# Patient Record
Sex: FEMALE | Race: WHITE | Employment: FULL TIME | URBAN - METROPOLITAN AREA
[De-identification: names, ages, dates, MRNs, and addresses within clinical notes are randomized per-mention and may not be internally consistent; named-entity substitution may affect disease eponyms.]

---

## 2017-04-18 ENCOUNTER — OFFICE VISIT (OUTPATIENT)
Dept: FAMILY MEDICINE CLINIC | Age: 42
End: 2017-04-18

## 2017-04-18 VITALS
HEIGHT: 68 IN | BODY MASS INDEX: 20.79 KG/M2 | WEIGHT: 137.2 LBS | OXYGEN SATURATION: 99 % | SYSTOLIC BLOOD PRESSURE: 150 MMHG | HEART RATE: 105 BPM | DIASTOLIC BLOOD PRESSURE: 104 MMHG

## 2017-04-18 DIAGNOSIS — I10 ESSENTIAL HYPERTENSION: ICD-10-CM

## 2017-04-18 DIAGNOSIS — F43.22 ADJUSTMENT DISORDER WITH ANXIETY: Primary | ICD-10-CM

## 2017-04-18 PROCEDURE — 99203 OFFICE O/P NEW LOW 30 MIN: CPT | Performed by: FAMILY MEDICINE

## 2017-04-18 PROCEDURE — G0444 DEPRESSION SCREEN ANNUAL: HCPCS | Performed by: FAMILY MEDICINE

## 2017-04-18 RX ORDER — LISINOPRIL 10 MG/1
10 TABLET ORAL DAILY
Qty: 30 TABLET | Refills: 3 | Status: SHIPPED | OUTPATIENT
Start: 2017-04-18 | End: 2022-02-15 | Stop reason: SDUPTHER

## 2017-04-18 RX ORDER — HYDROXYZINE HYDROCHLORIDE 25 MG/1
1 TABLET, FILM COATED ORAL EVERY 6 HOURS PRN
COMMUNITY
Start: 2017-04-13 | End: 2017-04-18 | Stop reason: SDUPTHER

## 2017-04-18 RX ORDER — NORETHINDRONE ACETATE AND ETHINYL ESTRADIOL 5-7-9-7
1 KIT ORAL DAILY
COMMUNITY
Start: 2017-03-21

## 2017-04-18 RX ORDER — HYDROXYZINE HYDROCHLORIDE 25 MG/1
25 TABLET, FILM COATED ORAL EVERY 6 HOURS PRN
Qty: 90 TABLET | Refills: 1 | Status: SHIPPED | OUTPATIENT
Start: 2017-04-18 | End: 2022-02-15 | Stop reason: SDUPTHER

## 2017-04-18 ASSESSMENT — ENCOUNTER SYMPTOMS
SINUS PRESSURE: 0
DIARRHEA: 0
VOMITING: 0
NAUSEA: 0
BLOOD IN STOOL: 0
BACK PAIN: 0
COUGH: 0
SORE THROAT: 0
EYE DISCHARGE: 0
SHORTNESS OF BREATH: 0

## 2017-04-18 ASSESSMENT — PATIENT HEALTH QUESTIONNAIRE - PHQ9
1. LITTLE INTEREST OR PLEASURE IN DOING THINGS: 0
2. FEELING DOWN, DEPRESSED OR HOPELESS: 2
SUM OF ALL RESPONSES TO PHQ9 QUESTIONS 1 & 2: 2
SUM OF ALL RESPONSES TO PHQ QUESTIONS 1-9: 2

## 2019-10-28 ENCOUNTER — HOSPITAL ENCOUNTER (OUTPATIENT)
Dept: MAMMOGRAPHY | Age: 44
Discharge: HOME | End: 2019-10-28
Payer: COMMERCIAL

## 2019-10-28 DIAGNOSIS — Z12.39 BREAST CANCER SCREENING: ICD-10-CM

## 2019-10-28 PROCEDURE — 77067 SCR MAMMO BI INCL CAD: CPT

## 2020-10-26 ENCOUNTER — HOSPITAL ENCOUNTER (OUTPATIENT)
Dept: MAMMOGRAPHY | Age: 45
Discharge: HOME | End: 2020-10-26
Payer: COMMERCIAL

## 2020-10-26 PROCEDURE — 77063 BREAST TOMOSYNTHESIS BI: CPT

## 2021-10-28 ENCOUNTER — HOSPITAL ENCOUNTER (OUTPATIENT)
Dept: MAMMOGRAPHY | Age: 46
Discharge: HOME | End: 2021-10-28
Payer: COMMERCIAL

## 2021-10-28 DIAGNOSIS — Z12.31 SCREENING MAMMOGRAM, ENCOUNTER FOR: ICD-10-CM

## 2021-10-28 PROCEDURE — 77063 BREAST TOMOSYNTHESIS BI: CPT

## 2022-02-15 ENCOUNTER — OFFICE VISIT (OUTPATIENT)
Dept: FAMILY MEDICINE CLINIC | Age: 47
End: 2022-02-15
Payer: COMMERCIAL

## 2022-02-15 VITALS
OXYGEN SATURATION: 98 % | HEART RATE: 105 BPM | BODY MASS INDEX: 20.31 KG/M2 | SYSTOLIC BLOOD PRESSURE: 126 MMHG | WEIGHT: 134 LBS | DIASTOLIC BLOOD PRESSURE: 84 MMHG | HEIGHT: 68 IN

## 2022-02-15 DIAGNOSIS — F43.22 ADJUSTMENT DISORDER WITH ANXIETY: ICD-10-CM

## 2022-02-15 DIAGNOSIS — I10 ESSENTIAL HYPERTENSION: ICD-10-CM

## 2022-02-15 LAB
ALBUMIN SERPL-MCNC: 4.8 G/DL (ref 3.4–5)
ANION GAP SERPL CALCULATED.3IONS-SCNC: 17 MMOL/L (ref 3–16)
BUN BLDV-MCNC: 14 MG/DL (ref 7–20)
CALCIUM SERPL-MCNC: 9.8 MG/DL (ref 8.3–10.6)
CHLORIDE BLD-SCNC: 95 MMOL/L (ref 99–110)
CO2: 24 MMOL/L (ref 21–32)
CREAT SERPL-MCNC: 0.7 MG/DL (ref 0.6–1.1)
GFR AFRICAN AMERICAN: >60
GFR NON-AFRICAN AMERICAN: >60
GLUCOSE BLD-MCNC: 117 MG/DL (ref 70–99)
PHOSPHORUS: 2.5 MG/DL (ref 2.5–4.9)
POTASSIUM SERPL-SCNC: 3.3 MMOL/L (ref 3.5–5.1)
SODIUM BLD-SCNC: 136 MMOL/L (ref 136–145)

## 2022-02-15 PROCEDURE — 99203 OFFICE O/P NEW LOW 30 MIN: CPT | Performed by: FAMILY MEDICINE

## 2022-02-15 RX ORDER — HYDROXYZINE HYDROCHLORIDE 25 MG/1
25 TABLET, FILM COATED ORAL EVERY 6 HOURS PRN
Qty: 90 TABLET | Refills: 5 | Status: SHIPPED | OUTPATIENT
Start: 2022-02-15

## 2022-02-15 RX ORDER — LISINOPRIL 10 MG/1
10 TABLET ORAL DAILY
Qty: 90 TABLET | Refills: 3 | Status: SHIPPED | OUTPATIENT
Start: 2022-02-15

## 2022-02-15 RX ORDER — ACETAMINOPHEN AND CODEINE PHOSPHATE 120; 12 MG/5ML; MG/5ML
1 SOLUTION ORAL DAILY
Qty: 28 TABLET | Refills: 1 | Status: SHIPPED | OUTPATIENT
Start: 2022-02-15 | End: 2022-04-06 | Stop reason: SDUPTHER

## 2022-02-15 ASSESSMENT — PATIENT HEALTH QUESTIONNAIRE - PHQ9
7. TROUBLE CONCENTRATING ON THINGS, SUCH AS READING THE NEWSPAPER OR WATCHING TELEVISION: 0
SUM OF ALL RESPONSES TO PHQ9 QUESTIONS 1 & 2: 0
SUM OF ALL RESPONSES TO PHQ QUESTIONS 1-9: 0
SUM OF ALL RESPONSES TO PHQ QUESTIONS 1-9: 0
9. THOUGHTS THAT YOU WOULD BE BETTER OFF DEAD, OR OF HURTING YOURSELF: 0
2. FEELING DOWN, DEPRESSED OR HOPELESS: 0
3. TROUBLE FALLING OR STAYING ASLEEP: 0
8. MOVING OR SPEAKING SO SLOWLY THAT OTHER PEOPLE COULD HAVE NOTICED. OR THE OPPOSITE, BEING SO FIGETY OR RESTLESS THAT YOU HAVE BEEN MOVING AROUND A LOT MORE THAN USUAL: 0
SUM OF ALL RESPONSES TO PHQ QUESTIONS 1-9: 0
SUM OF ALL RESPONSES TO PHQ QUESTIONS 1-9: 0
1. LITTLE INTEREST OR PLEASURE IN DOING THINGS: 0
4. FEELING TIRED OR HAVING LITTLE ENERGY: 0
6. FEELING BAD ABOUT YOURSELF - OR THAT YOU ARE A FAILURE OR HAVE LET YOURSELF OR YOUR FAMILY DOWN: 0
5. POOR APPETITE OR OVEREATING: 0
10. IF YOU CHECKED OFF ANY PROBLEMS, HOW DIFFICULT HAVE THESE PROBLEMS MADE IT FOR YOU TO DO YOUR WORK, TAKE CARE OF THINGS AT HOME, OR GET ALONG WITH OTHER PEOPLE: 0

## 2022-02-15 NOTE — PROGRESS NOTES
Chief Complaint   Patient presents with    New Patient     Moise Holliday is a pleasant lady presenting to establish today, her mother is also my patient Glorya Coma    Hypertension     She has hypertension which is adequately controlled on current medication    Anxiety     She has anxiety related to her  having very significant medical problems and requiring a lot of care from her while she also cares for their children and works    Other     She requests refill of birth control medication today       [place] NARRATIVE:    Patient is NEW to this provider today. Above chief complaint and [place] obtained by this physician provider. Review of Systems   Constitutional: Negative for activity change, chills, fatigue and fever. HENT: Negative for congestion. Respiratory: Negative for cough, chest tightness, shortness of breath and wheezing. Cardiovascular: Negative for chest pain and leg swelling. Gastrointestinal: Negative for abdominal pain. Musculoskeletal: Negative for back pain and myalgias. Skin: Negative for rash. Psychiatric/Behavioral: Positive for dysphoric mood (Patient admits to some anxiety and some depression dealing with stressors but prefers to stay on hydroxyzine and declines other medications) and sleep disturbance. Negative for behavioral problems. The patient is nervous/anxious. No Known Allergies  Allergy history updated. Outpatient Medications Marked as Taking for the 2/15/22 encounter (Office Visit) with Manfred Vaughan MD   Medication Sig Dispense Refill    norethindrone (NORLYDA) 0.35 MG tablet Take 1 tablet by mouth daily 28 tablet 1    hydrOXYzine (ATARAX) 25 MG tablet Take 1 tablet by mouth every 6 hours as needed for Anxiety 90 tablet 5    lisinopril (PRINIVIL;ZESTRIL) 10 MG tablet Take 1 tablet by mouth daily 90 tablet 3    TILIA FE 1-20/1-30/1-35 MG-MCG TABS Take 1 tablet by mouth daily         Medication list reviewed and reconciled by this provider. Tobacco use:     Past Medical History:   Diagnosis Date    Broken rosa elena     12years old     History reviewed. No pertinent surgical history. Family History   Problem Relation Age of Onset    High Cholesterol Mother     Hypertension Mother     Thyroid Disease Mother     High Cholesterol Father     Hypertension Father     Cancer Paternal Grandmother     Breast Cancer Neg Hx     Ovarian Cancer Neg Hx      Social History     Socioeconomic History    Marital status:      Spouse name: None    Number of children: None    Years of education: None    Highest education level: None   Occupational History    None   Tobacco Use    Smoking status: Never Smoker    Smokeless tobacco: Never Used   Substance and Sexual Activity    Alcohol use: No    Drug use: No    Sexual activity: Yes     Partners: Male     Comment:    Other Topics Concern    None   Social History Narrative    None     Social Determinants of Health     Financial Resource Strain:     Difficulty of Paying Living Expenses: Not on file   Food Insecurity:     Worried About Running Out of Food in the Last Year: Not on file    Red of Food in the Last Year: Not on file   Transportation Needs:     Lack of Transportation (Medical): Not on file    Lack of Transportation (Non-Medical):  Not on file   Physical Activity:     Days of Exercise per Week: Not on file    Minutes of Exercise per Session: Not on file   Stress:     Feeling of Stress : Not on file   Social Connections:     Frequency of Communication with Friends and Family: Not on file    Frequency of Social Gatherings with Friends and Family: Not on file    Attends Sikhism Services: Not on file    Active Member of Clubs or Organizations: Not on file    Attends Club or Organization Meetings: Not on file    Marital Status: Not on file   Intimate Partner Violence:     Fear of Current or Ex-Partner: Not on file    Emotionally Abused: Not on file    Physically Abused: Not on file    Sexually Abused: Not on file   Housing Stability:     Unable to Pay for Housing in the Last Year: Not on file    Number of Places Lived in the Last Year: Not on file    Unstable Housing in the Last Year: Not on file       Nursing note reviewed. Vitals:    02/15/22 1359   BP: 126/84   Site: Left Upper Arm   Position: Sitting   Cuff Size: Medium Adult   Pulse: 105   SpO2: 98%   Weight: 134 lb (60.8 kg)   Height: 5' 8\" (1.727 m)       BP Readings from Last 3 Encounters:   02/15/22 126/84   04/18/17 (!) 150/104     Wt Readings from Last 3 Encounters:   02/15/22 134 lb (60.8 kg)   04/18/17 137 lb 3.2 oz (62.2 kg)     Body mass index is 20.37 kg/m². No results found for this visit on 02/15/22. Physical Exam  Vitals and nursing note reviewed. Constitutional:       General: She is not in acute distress. Appearance: She is well-developed. She is not diaphoretic. HENT:      Head: Normocephalic and atraumatic. Right Ear: External ear normal.      Left Ear: External ear normal.   Eyes:      General:         Right eye: No discharge. Left eye: No discharge. Conjunctiva/sclera: Conjunctivae normal.      Pupils: Pupils are equal, round, and reactive to light. Neck:      Thyroid: No thyromegaly. Cardiovascular:      Rate and Rhythm: Normal rate and regular rhythm. Heart sounds: Normal heart sounds. No murmur heard. No gallop. Pulmonary:      Effort: Pulmonary effort is normal. No respiratory distress. Breath sounds: Normal breath sounds. No wheezing or rales. Abdominal:      General: Bowel sounds are normal. There is no distension. Palpations: Abdomen is soft. Musculoskeletal:      Cervical back: Normal range of motion and neck supple. Lymphadenopathy:      Cervical: No cervical adenopathy. Skin:     General: Skin is warm and dry. Findings: No erythema or rash.    Neurological:      Mental Status: She is alert and oriented to person,

## 2022-02-17 NOTE — RESULT ENCOUNTER NOTE
This lab result shows that potassium and chloride are both very low. Possibly enough to be causing fatigue or muscle cramps. Normally this would be caused by a water pill or sometimes by a low salt intake or sometimes by alcohol intake. I would suggest adding a salty snack daily, a handful of pretzels or salting her food extra or using soy sauce. This might give her a little more energy moving forward.   We can recheck levels later if she is not doing well but if she feels fine and does not feel different adding salt to her diet okay to just monitor this next visit

## 2022-02-27 ASSESSMENT — ENCOUNTER SYMPTOMS
ABDOMINAL PAIN: 0
BACK PAIN: 0
COUGH: 0
SHORTNESS OF BREATH: 0
CHEST TIGHTNESS: 0
WHEEZING: 0

## 2022-02-28 RX ORDER — HYDROCHLOROTHIAZIDE 12.5 MG/1
12.5 TABLET ORAL DAILY
COMMUNITY
Start: 2021-06-09 | End: 2022-02-28 | Stop reason: SDUPTHER

## 2022-02-28 RX ORDER — HYDROCHLOROTHIAZIDE 12.5 MG/1
12.5 TABLET ORAL DAILY
Qty: 30 TABLET | Refills: 2 | Status: SHIPPED | OUTPATIENT
Start: 2022-02-28 | End: 2022-06-21 | Stop reason: SDUPTHER

## 2022-04-06 ENCOUNTER — HOSPITAL ENCOUNTER (OUTPATIENT)
Age: 47
Setting detail: SPECIMEN
Discharge: HOME | End: 2022-04-06
Payer: COMMERCIAL

## 2022-04-06 ENCOUNTER — OFFICE VISIT (OUTPATIENT)
Dept: OBGYN | Age: 47
End: 2022-04-06
Payer: COMMERCIAL

## 2022-04-06 VITALS
HEIGHT: 68 IN | SYSTOLIC BLOOD PRESSURE: 133 MMHG | BODY MASS INDEX: 20.61 KG/M2 | DIASTOLIC BLOOD PRESSURE: 76 MMHG | WEIGHT: 136 LBS

## 2022-04-06 DIAGNOSIS — Z87.42 HISTORY OF DYSMENORRHEA: ICD-10-CM

## 2022-04-06 DIAGNOSIS — Z01.419 WOMEN'S ANNUAL ROUTINE GYNECOLOGICAL EXAMINATION: Primary | ICD-10-CM

## 2022-04-06 PROCEDURE — 99396 PREV VISIT EST AGE 40-64: CPT | Performed by: NURSE PRACTITIONER

## 2022-04-06 PROCEDURE — 87624 HPV HI-RISK TYP POOLED RSLT: CPT

## 2022-04-06 PROCEDURE — 88142 CYTOPATH C/V THIN LAYER: CPT

## 2022-04-06 RX ORDER — ACETAMINOPHEN AND CODEINE PHOSPHATE 120; 12 MG/5ML; MG/5ML
1 SOLUTION ORAL DAILY
Qty: 28 TABLET | Refills: 11 | Status: SHIPPED | OUTPATIENT
Start: 2022-04-06

## 2022-04-06 SDOH — SOCIAL STABILITY: SOCIAL INSECURITY: WITHIN THE LAST YEAR, HAVE YOU BEEN HUMILIATED OR EMOTIONALLY ABUSED IN OTHER WAYS BY YOUR PARTNER OR EX-PARTNER?: NO

## 2022-04-06 SDOH — ECONOMIC STABILITY: TRANSPORTATION INSECURITY
IN THE PAST 12 MONTHS, HAS THE LACK OF TRANSPORTATION KEPT YOU FROM MEDICAL APPOINTMENTS OR FROM GETTING MEDICATIONS?: NO

## 2022-04-06 SDOH — ECONOMIC STABILITY: HOUSING INSECURITY
IN THE LAST 12 MONTHS, WAS THERE A TIME WHEN YOU DID NOT HAVE A STEADY PLACE TO SLEEP OR SLEPT IN A SHELTER (INCLUDING NOW)?: NO

## 2022-04-06 SDOH — ECONOMIC STABILITY: FOOD INSECURITY: WITHIN THE PAST 12 MONTHS, THE FOOD YOU BOUGHT JUST DIDN'T LAST AND YOU DIDN'T HAVE MONEY TO GET MORE.: NEVER TRUE

## 2022-04-06 SDOH — ECONOMIC STABILITY: FOOD INSECURITY: WITHIN THE PAST 12 MONTHS, YOU WORRIED THAT YOUR FOOD WOULD RUN OUT BEFORE YOU GOT MONEY TO BUY MORE.: NEVER TRUE

## 2022-04-06 SDOH — SOCIAL STABILITY: SOCIAL INSECURITY: WITHIN THE LAST YEAR, HAVE YOU BEEN AFRAID OF YOUR PARTNER OR EX-PARTNER?: NO

## 2022-04-06 SDOH — ECONOMIC STABILITY: TRANSPORTATION INSECURITY
IN THE PAST 12 MONTHS, HAS LACK OF TRANSPORTATION KEPT YOU FROM MEETINGS, WORK, OR FROM GETTING THINGS NEEDED FOR DAILY LIVING?: NO

## 2022-04-06 SDOH — ECONOMIC STABILITY: INCOME INSECURITY: IN THE LAST 12 MONTHS, WAS THERE A TIME WHEN YOU WERE NOT ABLE TO PAY THE MORTGAGE OR RENT ON TIME?: NO

## 2022-04-06 SDOH — SOCIAL STABILITY: SOCIAL INSECURITY
WITHIN THE LAST YEAR, HAVE YOU BEEN KICKED, HIT, SLAPPED, OR OTHERWISE PHYSICALLY HURT BY YOUR PARTNER OR EX-PARTNER?: NO

## 2022-04-06 SDOH — SOCIAL STABILITY: SOCIAL INSECURITY
WITHIN THE LAST YEAR, HAVE TO BEEN RAPED OR FORCED TO HAVE ANY KIND OF SEXUAL ACTIVITY BY YOUR PARTNER OR EX-PARTNER?: NO

## 2022-04-06 SDOH — ECONOMIC STABILITY: INCOME INSECURITY: HOW HARD IS IT FOR YOU TO PAY FOR THE VERY BASICS LIKE FOOD, HOUSING, MEDICAL CARE, AND HEATING?: NOT VERY HARD

## 2022-04-06 ASSESSMENT — ENCOUNTER SYMPTOMS
GASTROINTESTINAL NEGATIVE: 1
RESPIRATORY NEGATIVE: 1

## 2022-04-06 ASSESSMENT — SOCIAL DETERMINANTS OF HEALTH (SDOH): HOW HARD IS IT FOR YOU TO PAY FOR THE VERY BASICS LIKE FOOD, HOUSING, MEDICAL CARE, AND HEATING?: NOT HARD AT ALL

## 2022-04-06 NOTE — PROGRESS NOTES
22    Stew Ruggiero  1975    Chief Complaint   Patient presents with    Gynecologic Exam     pt here for annual exam, pt does not menstrate due to oral contraceptive, pt states she is not currently sexually active, last pap was - normal, pt last mammo was - normal.    Med Refill Clerical     pt requesting refill of Norlyda. The patient is a 40 y.o. female, [facility] who presents for her annual exam.  She amenorrheic due to OCPs. She is not sexually active. She is currently taking birth control. Birth control method is oral contraceptive. She reports no gynecological symptoms. Pap smear history: Her last PAP smear was in . Her results were normal.    Breast history: her most recent mammogram was in 10/2021. The results were: Normal    Past Medical History:   Diagnosis Date    Abnormal Pap smear of cervix     Anxiety     Broken collarbone     12years old    Hypertension     Migraine        Past Surgical History:   Procedure Laterality Date     SECTION         Family History   Problem Relation Age of Onset    High Cholesterol Mother     Hypertension Mother     Thyroid Disease Mother     High Cholesterol Father     Hypertension Father     Cancer Paternal Grandmother     Breast Cancer Neg Hx     Ovarian Cancer Neg Hx        Social History     Tobacco Use    Smoking status: Never Smoker    Smokeless tobacco: Never Used   Vaping Use    Vaping Use: Never used   Substance Use Topics    Alcohol use:  Yes     Alcohol/week: 1.0 standard drink     Types: 1 Glasses of wine per week     Comment: occ    Drug use: No       Current Outpatient Medications   Medication Sig Dispense Refill    norethindrone (NORLYDA) 0.35 MG tablet Take 1 tablet by mouth daily 28 tablet 11    hydroCHLOROthiazide (HYDRODIURIL) 12.5 MG tablet Take 1 tablet by mouth daily 30 tablet 2    hydrOXYzine (ATARAX) 25 MG tablet Take 1 tablet by mouth every 6 hours as needed for Anxiety 90 tablet 5    lisinopril (PRINIVIL;ZESTRIL) 10 MG tablet Take 1 tablet by mouth daily 90 tablet 3    TILIA FE 1-20/1-30/1-35 MG-MCG TABS Take 1 tablet by mouth daily       No current facility-administered medications for this visit. No Known Allergies        Immunization History   Administered Date(s) Administered    COVID-19, Pfizer Purple top, DILUTE for use, 12+ yrs, 30mcg/0.3mL dose 2021, 2021, 2021    Influenza A ([id number]) Vaccine PF IM 2009    Influenza Nasal 10/29/2012    Influenza Virus Vaccine 10/29/2012, 2017, 2018, 10/26/2019, 2021    Influenza Whole 2011    Influenza, Live, Intranasal, Quadv, (Flumist 2-49 yrs) 10/22/2014    Influenza, Dalia Rebecca, IM, PF (6 mo and older Fluzone, Flulaval, Fluarix, and 3 yrs and older Afluria) 10/23/2013, 10/15/2015, 10/26/2019, 10/29/2020       Review of Systems   Constitutional: Negative. Respiratory: Negative. Gastrointestinal: Negative. Genitourinary: Negative. /76   Ht 5' 8\" (1.727 m)   Wt 136 lb (61.7 kg)   LMP 2017 (Within Days)   BMI 20.68 kg/m²     Physical Exam  Vitals and nursing note reviewed. Constitutional:       Appearance: Normal appearance. She is normal weight. HENT:      Head: Normocephalic. Pulmonary:      Effort: Pulmonary effort is normal.   Chest:   Breasts:      Right: Normal.      Left: Normal.       Abdominal:      Palpations: Abdomen is soft. Genitourinary:     General: Normal vulva. Vagina: Normal.      Cervix: Normal.      Uterus: Normal.       Adnexa: Right adnexa normal and left adnexa normal.      Rectum: Normal.   Musculoskeletal:         General: Normal range of motion. Cervical back: Normal range of motion. Skin:     General: Skin is warm and dry. Neurological:      Mental Status: She is alert.    Psychiatric:         Attention and Perception: Attention normal.         Mood and Affect: Mood normal.         No results found for this visit on 04/06/22. Assessment and Plan   Diagnosis Orders   1. Women's annual routine gynecological examination  PAP SMEAR   2. History of dysmenorrhea         Return in about 1 year (around 4/6/2023).     KARISHMA Nolan - CNP

## 2022-04-10 LAB
HPV HIGH RISK: NOT DETECTED
HPV, GENOTYPE 16: NOT DETECTED
HPV, GENOTYPE 18: NOT DETECTED

## 2022-06-21 RX ORDER — HYDROCHLOROTHIAZIDE 12.5 MG/1
12.5 TABLET ORAL DAILY
Qty: 30 TABLET | Refills: 2 | Status: SHIPPED | OUTPATIENT
Start: 2022-06-21 | End: 2022-09-12

## 2022-09-12 RX ORDER — HYDROCHLOROTHIAZIDE 12.5 MG/1
12.5 TABLET ORAL DAILY
Qty: 30 TABLET | Refills: 9 | Status: SHIPPED | OUTPATIENT
Start: 2022-09-12

## 2022-09-13 ENCOUNTER — PATIENT MESSAGE (OUTPATIENT)
Dept: FAMILY MEDICINE CLINIC | Age: 47
End: 2022-09-13

## 2022-09-14 NOTE — TELEPHONE ENCOUNTER
From: Dot Escalera  To: Dr. Shell Todd: 9/13/2022 5:43 PM EDT  Subject: Nelma Class last visit    I know you just saw Tisha Scale I have been dealing with a lot of personal issues. I would really like to talk to you about some personal issues with Tisha Scale. Is there a number where I can message you directly.     Thanks,  Alec Saenz

## 2022-11-08 ENCOUNTER — HOSPITAL ENCOUNTER (OUTPATIENT)
Dept: MAMMOGRAPHY | Age: 47
Discharge: HOME | End: 2022-11-08
Payer: COMMERCIAL

## 2022-11-08 DIAGNOSIS — Z12.31 SCREENING MAMMOGRAM, ENCOUNTER FOR: ICD-10-CM

## 2022-11-08 PROCEDURE — 77063 BREAST TOMOSYNTHESIS BI: CPT

## 2022-12-08 DIAGNOSIS — I10 ESSENTIAL HYPERTENSION: ICD-10-CM

## 2022-12-08 RX ORDER — LISINOPRIL 10 MG/1
10 TABLET ORAL DAILY
Qty: 90 TABLET | Refills: 1 | Status: SHIPPED | OUTPATIENT
Start: 2022-12-08

## 2023-02-17 ENCOUNTER — OFFICE VISIT (OUTPATIENT)
Dept: FAMILY MEDICINE CLINIC | Age: 48
End: 2023-02-17
Payer: COMMERCIAL

## 2023-02-17 VITALS
SYSTOLIC BLOOD PRESSURE: 102 MMHG | WEIGHT: 145 LBS | BODY MASS INDEX: 22.05 KG/M2 | OXYGEN SATURATION: 96 % | DIASTOLIC BLOOD PRESSURE: 62 MMHG | HEART RATE: 99 BPM

## 2023-02-17 DIAGNOSIS — Z12.11 SCREEN FOR COLON CANCER: ICD-10-CM

## 2023-02-17 DIAGNOSIS — Z11.9 SCREENING EXAMINATION FOR INFECTIOUS DISEASE: ICD-10-CM

## 2023-02-17 DIAGNOSIS — E78.2 MIXED HYPERLIPIDEMIA: ICD-10-CM

## 2023-02-17 DIAGNOSIS — Z00.01 ENCOUNTER FOR WELL ADULT EXAM WITH ABNORMAL FINDINGS: Primary | ICD-10-CM

## 2023-02-17 DIAGNOSIS — Z23 NEED FOR TDAP VACCINATION: ICD-10-CM

## 2023-02-17 DIAGNOSIS — R53.83 OTHER FATIGUE: ICD-10-CM

## 2023-02-17 DIAGNOSIS — F43.22 ADJUSTMENT DISORDER WITH ANXIETY: ICD-10-CM

## 2023-02-17 DIAGNOSIS — I10 ESSENTIAL HYPERTENSION: ICD-10-CM

## 2023-02-17 PROCEDURE — 3078F DIAST BP <80 MM HG: CPT | Performed by: FAMILY MEDICINE

## 2023-02-17 PROCEDURE — 3074F SYST BP LT 130 MM HG: CPT | Performed by: FAMILY MEDICINE

## 2023-02-17 PROCEDURE — 99396 PREV VISIT EST AGE 40-64: CPT | Performed by: FAMILY MEDICINE

## 2023-02-17 RX ORDER — LISINOPRIL 10 MG/1
10 TABLET ORAL DAILY
Qty: 30 TABLET | Refills: 12 | Status: SHIPPED | OUTPATIENT
Start: 2023-02-17

## 2023-02-17 RX ORDER — HYDROCHLOROTHIAZIDE 12.5 MG/1
12.5 TABLET ORAL DAILY
Qty: 30 TABLET | Refills: 12 | Status: SHIPPED | OUTPATIENT
Start: 2023-02-17

## 2023-02-17 ASSESSMENT — PATIENT HEALTH QUESTIONNAIRE - PHQ9
SUM OF ALL RESPONSES TO PHQ QUESTIONS 1-9: 0
1. LITTLE INTEREST OR PLEASURE IN DOING THINGS: 0
2. FEELING DOWN, DEPRESSED OR HOPELESS: 0
SUM OF ALL RESPONSES TO PHQ9 QUESTIONS 1 & 2: 0

## 2023-02-17 NOTE — PROGRESS NOTES
Chief Complaint   Patient presents with    Annual Exam     Requesting annual checkup    Hypertension     Patient on medication for hypertension, due for annual labs    Cholesterol Problem     Patient with mild cholesterol elevation at last set of labs in 2020, we will recheck today, however she is not fasting    Other     Patient has significant anxiety and some depression, mostly related to life situation, although states that recently  from  and he will be moving to an assisted living option taking some stress off her       [place] NARRATIVE:    Patient is established unless otherwise noted. Above chief complaint and [place] obtained by this physician provider. Review of Systems   Constitutional:  Negative for activity change, chills, fatigue and fever. HENT:  Negative for congestion. Respiratory:  Negative for cough, chest tightness, shortness of breath and wheezing. Cardiovascular:  Negative for chest pain and leg swelling. Gastrointestinal:  Negative for abdominal pain. Musculoskeletal:  Negative for back pain and myalgias. Skin:  Negative for rash. Psychiatric/Behavioral:  Negative for behavioral problems and dysphoric mood (improved with upcoming divorce). No Known Allergies  Allergy historyupdated. Outpatient Medications Marked as Taking for the 23 encounter (Office Visit) with Sharath Ward MD   Medication Sig Dispense Refill    [] Tetanus-Diphth-Acell Pertussis (BOOSTRIX) 5-2.5-18.5 LF-MCG/0.5 injection Inject 0.5 mLs into the muscle once for 1 dose 1 each 0    lisinopril (PRINIVIL;ZESTRIL) 10 MG tablet Take 1 tablet by mouth daily 30 tablet 12    hydroCHLOROthiazide (HYDRODIURIL) 12.5 MG tablet Take 1 tablet by mouth daily 30 tablet 12    norethindrone (NORLYDA) 0.35 MG tablet Take 1 tablet by mouth daily 28 tablet 11       Tobacco use history updated.    Social History     Tobacco Use   Smoking Status Never   Smokeless Tobacco Never        Nursing note reviewed. Vitals:    02/17/23 0923   BP: 102/62   Site: Left Upper Arm   Position: Sitting   Cuff Size: Medium Adult   Pulse: 99   SpO2: 96%   Weight: 145 lb (65.8 kg)          BP Readings from Last 3 Encounters:   02/17/23 102/62   04/06/22 133/76   02/15/22 126/84     Wt Readings from Last 3 Encounters:   02/17/23 145 lb (65.8 kg)   04/06/22 136 lb (61.7 kg)   02/15/22 134 lb (60.8 kg)     Body mass index is 22.05 kg/m². No results found for this visit on 02/17/23. Physical Exam  Vitals and nursing note reviewed. Constitutional:       General: She is not in acute distress. Appearance: She is well-developed. She is not diaphoretic. HENT:      Head: Normocephalic. Eyes:      General:         Right eye: No discharge. Left eye: No discharge. Conjunctiva/sclera: Conjunctivae normal.      Pupils: Pupils are equal, round, and reactive to light. Cardiovascular:      Rate and Rhythm: Normal rate and regular rhythm. Heart sounds: Normal heart sounds. No murmur heard. Pulmonary:      Effort: Pulmonary effort is normal. No respiratory distress. Breath sounds: Normal breath sounds. No wheezing or rales. Musculoskeletal:      Cervical back: Normal range of motion. Skin:     General: Skin is warm and dry. Neurological:      Mental Status: She is alert and oriented to person, place, and time. Psychiatric:         Behavior: Behavior normal.         _________________________________________________  Assessment:     1. Encounter for well adult exam with abnormal findings  2. Essential hypertension  -     Comprehensive Metabolic Panel, Fasting; Future  -     Lipid, Fasting; Future  -     lisinopril (PRINIVIL;ZESTRIL) 10 MG tablet; Take 1 tablet by mouth daily, Disp-30 tablet, R-12Normal  3. Adjustment disorder with anxiety  4. Screen for colon cancer  -     Fecal DNA Colorectal cancer screening (Cologuard)  5. Other fatigue  -     TSH; Future  -     CBC; Future  6.  Screening examination for infectious disease  -     HIV Screen; Future  -     Hepatitis C Antibody; Future  7. Mixed hyperlipidemia  -     Lipid, Fasting; Future  8. Need for Tdap vaccination  -     Tetanus-Diphth-Acell Pertussis ([address]) 5-2.5-18.5 LF-MCG/0.5 injection; Inject 0.5 mLs into the muscle once for 1 dose, Disp-1 each, R-0Print    Problems listed above are stable and therapeutic plan is unchanged unless otherwise specified. See orders above and comments below for details of workup or medication orders. _________________________________________________  Plan:     Return if symptoms worsen or fail to improve, for annually.       Electronically signed by Brie Bolton MD on 2/17/23 at 9:39 AM EST

## 2023-03-04 ASSESSMENT — ENCOUNTER SYMPTOMS
SHORTNESS OF BREATH: 0
COUGH: 0
WHEEZING: 0
CHEST TIGHTNESS: 0
ABDOMINAL PAIN: 0
BACK PAIN: 0

## 2023-03-13 RX ORDER — NORETHINDRONE 0.35 MG/1
TABLET ORAL
Qty: 28 TABLET | Refills: 11 | Status: SHIPPED | OUTPATIENT
Start: 2023-03-13

## 2023-03-27 LAB — NONINV COLON CA DNA+OCC BLD SCRN STL QL: NEGATIVE

## 2023-05-12 LAB
ALBUMIN/GLOBULIN RATIO: 2.5 RATIO (ref 0.8–2.6)
ALBUMIN: 4.9 G/DL (ref 3.5–5.2)
ALP BLD-CCNC: 70 U/L (ref 23–144)
ALT SERPL-CCNC: 13 U/L (ref 0–60)
AST SERPL-CCNC: 15 U/L (ref 0–55)
BILIRUB SERPL-MCNC: 1.4 MG/DL (ref 0–1.2)
BUN BLDV-MCNC: 17 MG/DL (ref 3–29)
BUN/CREAT BLD: 17 (ref 7–25)
CALCIUM SERPL-MCNC: 9.6 MG/DL (ref 8.5–10.5)
CHLORIDE BLD-SCNC: 96 MEQ/L (ref 96–110)
CHOLESTEROL: 241 MG/DL
CO2: 26 MEQ/L (ref 19–32)
CREAT SERPL-MCNC: 1 MG/DL (ref 0.5–1.2)
GLOBULIN: 2 G/DL (ref 1.9–3.6)
GLOMERULAR FILTRATION RATE: 70 MLS/MIN/1.73M2
GLUCOSE BLD-MCNC: 101 MG/DL (ref 70–99)
HCT VFR BLD CALC: 41.4 % (ref 34–49)
HDLC SERPL-MCNC: 79 MG/DL
HEMOGLOBIN: 14.5 G/DL (ref 11.2–15.7)
HEPATITIS C ANTIBODY: NEGATIVE
LDL CHOLESTEROL CALCULATED: 152 MG/DL
MCH RBC QN AUTO: 33 PG (ref 26–34)
MCHC RBC AUTO-ENTMCNC: 35 G/DL (ref 30.7–35.5)
MCV RBC AUTO: 94.1 FL (ref 80–100)
PDW BLD-RTO: 11.7 %
PLATELET # BLD: 225 K/UL (ref 140–400)
PMV BLD AUTO: 10.8 FL (ref 7.2–11.7)
POTASSIUM SERPL-SCNC: 4 MEQ/L (ref 3.4–5.3)
RBC # BLD: 4.4 M/UL (ref 3.95–5.26)
SODIUM BLD-SCNC: 136 MEQ/L (ref 135–148)
STATUS: ABNORMAL
TOTAL PROTEIN: 6.9 G/DL (ref 6–8.3)
TRIGL SERPL-MCNC: 52 MG/DL
TSH SERPL DL<=0.05 MIU/L-ACNC: 3.59 MCIU/ML (ref 0.4–4.5)
VLDLC SERPL CALC-MCNC: 10 MG/DL (ref 4–32)
WBC: 5.9 K/UL (ref 3.5–10.9)

## 2023-05-13 LAB — HIV 1+2 AB+HIV1P24 AG, EIA: NON REACTIVE

## 2023-05-13 NOTE — RESULT ENCOUNTER NOTE
Recent labs reviewed. Cholesterol profile is ok although not optimal.  LDL (low density lipoprotein) was high, raising vascular risk of heart attack and stroke but has lots of good cholesterol (HDL) and is otherwise low risk we will watch no medication needed. Metabolic panel ok exceptfor mild increase in glucose (which is old) and also in bilirubin, which is probably normal for her we will watch. No changes recommended from these results. Not routed for staff call but released to my chart only.

## 2023-11-08 ENCOUNTER — HOSPITAL ENCOUNTER (OUTPATIENT)
Dept: MAMMOGRAPHY | Age: 48
Discharge: HOME | End: 2023-11-08
Payer: COMMERCIAL

## 2023-11-08 VITALS — WEIGHT: 140 LBS | HEIGHT: 69 IN | BODY MASS INDEX: 20.73 KG/M2

## 2023-11-08 DIAGNOSIS — Z12.31 BREAST CANCER SCREENING BY MAMMOGRAM: ICD-10-CM

## 2023-11-08 PROCEDURE — 77067 SCR MAMMO BI INCL CAD: CPT

## 2023-11-08 PROCEDURE — 77063 BREAST TOMOSYNTHESIS BI: CPT

## 2024-02-22 DIAGNOSIS — I10 ESSENTIAL HYPERTENSION: ICD-10-CM

## 2024-02-22 RX ORDER — HYDROCHLOROTHIAZIDE 12.5 MG/1
12.5 TABLET ORAL DAILY
Qty: 30 TABLET | Refills: 2 | Status: SHIPPED | OUTPATIENT
Start: 2024-02-22

## 2024-02-22 RX ORDER — LISINOPRIL 10 MG/1
10 TABLET ORAL DAILY
Qty: 30 TABLET | Refills: 2 | Status: SHIPPED | OUTPATIENT
Start: 2024-02-22

## 2024-02-22 NOTE — TELEPHONE ENCOUNTER
Last appointment: 2/17/2023   Next Appointment: 4/8/2024     Allergies:  No Known Allergies      Recent Vitals:  Wt Readings from Last 3 Encounters:   11/08/23 63.5 kg (140 lb)   04/14/23 65.3 kg (144 lb)   02/17/23 65.8 kg (145 lb)     Ht Readings from Last 3 Encounters:   11/08/23 1.753 m (5' 9\")   04/14/23 1.727 m (5' 8\")   04/06/22 1.727 m (5' 8\")     BP Readings from Last 3 Encounters:   04/14/23 125/68   02/17/23 102/62   04/06/22 133/76     BMI Readings from Last 3 Encounters:   11/08/23 20.67 kg/m²   04/14/23 21.90 kg/m²   02/17/23 22.05 kg/m²       Recent Labs__________________________________________________________________________    Renal:   Creatinine   Date Value Ref Range Status   05/12/2023 1.0 0.5 - 1.2 MG/DL Final     BUN   Date Value Ref Range Status   05/12/2023 17 3 - 29 MG/DL Final     Sodium   Date Value Ref Range Status   05/12/2023 136 135 - 148 MEQ/L Final     Potassium   Date Value Ref Range Status   05/12/2023 4.0 3.4 - 5.3 MEQ/L Final     Chloride   Date Value Ref Range Status   05/12/2023 96 96 - 110 MEQ/L Final     CO2   Date Value Ref Range Status   05/12/2023 26 19 - 32 MEQ/L Final       BMP:    Sodium   Date Value Ref Range Status   05/12/2023 136 135 - 148 MEQ/L Final     Potassium   Date Value Ref Range Status   05/12/2023 4.0 3.4 - 5.3 MEQ/L Final     Chloride   Date Value Ref Range Status   05/12/2023 96 96 - 110 MEQ/L Final     CO2   Date Value Ref Range Status   05/12/2023 26 19 - 32 MEQ/L Final     BUN   Date Value Ref Range Status   05/12/2023 17 3 - 29 MG/DL Final     Creatinine   Date Value Ref Range Status   05/12/2023 1.0 0.5 - 1.2 MG/DL Final     Glucose   Date Value Ref Range Status   05/12/2023 101 (H) 70 - 99 MG/DL Final     Comment:     (NOTE)  The American Diabetic Association recommends the following   guidelines: MG/DL     <100  = NORMAL GLUCOSE  100-125= IMPAIRED FASTING GLUCOSE  >=126 = PROVISIONAL DIAGNOSIS OF DIABETES  ADA Workgroup Report, Diabetic Care,

## 2024-03-18 RX ORDER — ACETAMINOPHEN AND CODEINE PHOSPHATE 120; 12 MG/5ML; MG/5ML
1 SOLUTION ORAL DAILY
Qty: 84 TABLET | Refills: 3 | OUTPATIENT
Start: 2024-03-18

## 2024-03-25 RX ORDER — ACETAMINOPHEN AND CODEINE PHOSPHATE 120; 12 MG/5ML; MG/5ML
1 SOLUTION ORAL DAILY
Qty: 84 TABLET | Refills: 0 | Status: SHIPPED | OUTPATIENT
Start: 2024-03-25

## 2024-04-07 ASSESSMENT — PATIENT HEALTH QUESTIONNAIRE - PHQ9
2. FEELING DOWN, DEPRESSED OR HOPELESS: NOT AT ALL
1. LITTLE INTEREST OR PLEASURE IN DOING THINGS: NOT AT ALL
SUM OF ALL RESPONSES TO PHQ9 QUESTIONS 1 & 2: 0
SUM OF ALL RESPONSES TO PHQ9 QUESTIONS 1 & 2: 0
2. FEELING DOWN, DEPRESSED OR HOPELESS: NOT AT ALL
SUM OF ALL RESPONSES TO PHQ QUESTIONS 1-9: 0
1. LITTLE INTEREST OR PLEASURE IN DOING THINGS: NOT AT ALL

## 2024-04-08 ENCOUNTER — OFFICE VISIT (OUTPATIENT)
Dept: FAMILY MEDICINE CLINIC | Age: 49
End: 2024-04-08
Payer: COMMERCIAL

## 2024-04-08 VITALS
OXYGEN SATURATION: 98 % | WEIGHT: 143 LBS | SYSTOLIC BLOOD PRESSURE: 122 MMHG | DIASTOLIC BLOOD PRESSURE: 80 MMHG | BODY MASS INDEX: 21.12 KG/M2 | HEART RATE: 107 BPM

## 2024-04-08 DIAGNOSIS — Z23 NEED FOR TDAP VACCINATION: ICD-10-CM

## 2024-04-08 DIAGNOSIS — Z00.00 ENCOUNTER FOR WELL ADULT EXAM WITHOUT ABNORMAL FINDINGS: Primary | ICD-10-CM

## 2024-04-08 DIAGNOSIS — I10 PRIMARY HYPERTENSION: ICD-10-CM

## 2024-04-08 DIAGNOSIS — E78.2 MIXED HYPERLIPIDEMIA: ICD-10-CM

## 2024-04-08 PROCEDURE — 90471 IMMUNIZATION ADMIN: CPT | Performed by: FAMILY MEDICINE

## 2024-04-08 PROCEDURE — 3074F SYST BP LT 130 MM HG: CPT | Performed by: FAMILY MEDICINE

## 2024-04-08 PROCEDURE — 3079F DIAST BP 80-89 MM HG: CPT | Performed by: FAMILY MEDICINE

## 2024-04-08 PROCEDURE — 99396 PREV VISIT EST AGE 40-64: CPT | Performed by: FAMILY MEDICINE

## 2024-04-08 PROCEDURE — 90715 TDAP VACCINE 7 YRS/> IM: CPT | Performed by: FAMILY MEDICINE

## 2024-04-08 RX ORDER — LISINOPRIL 10 MG/1
10 TABLET ORAL DAILY
Qty: 90 TABLET | Refills: 3 | Status: SHIPPED | OUTPATIENT
Start: 2024-04-08

## 2024-04-08 RX ORDER — HYDROCHLOROTHIAZIDE 12.5 MG/1
12.5 TABLET ORAL DAILY
Qty: 90 TABLET | Refills: 3 | Status: SHIPPED | OUTPATIENT
Start: 2024-04-08

## 2024-04-08 RX ORDER — ACETAMINOPHEN AND CODEINE PHOSPHATE 120; 12 MG/5ML; MG/5ML
1 SOLUTION ORAL DAILY
Qty: 84 TABLET | Refills: 4 | Status: SHIPPED | OUTPATIENT
Start: 2024-04-08

## 2024-04-08 ASSESSMENT — ENCOUNTER SYMPTOMS
ABDOMINAL PAIN: 0
WHEEZING: 0
BACK PAIN: 0
SHORTNESS OF BREATH: 0
CHEST TIGHTNESS: 0
COUGH: 0

## 2024-04-08 NOTE — PROGRESS NOTES
Chief Complaint   Patient presents with    Annual Exam     Patient is here for annual checkup    Hypertension     Patient has hypertension is on lisinopril and hydrochlorothiazide, good control today, mild tachycardia    Contraception     Patient is on birth control, age 48 non-smoker    Cholesterol Problem     Patient has had elevated cholesterol in the past, never severe enough to require medication but due for recheck       [place] NARRATIVE:    Multiple issues as above.  Her stress is under good control and she is doing well with her 2 teenage boys.  Her ex- is now in assisted living and she remains his POA and she continues to help him but he no longer lives with her so her stress level is improved.    Patient is established unless otherwise noted.  Above chief complaint and [place] obtained by this physician provider.     Review of Systems   Constitutional:  Negative for activity change, chills, fatigue and fever.   HENT:  Negative for congestion.    Respiratory:  Negative for cough, chest tightness, shortness of breath and wheezing.    Cardiovascular:  Negative for chest pain and leg swelling.   Gastrointestinal:  Negative for abdominal pain.   Musculoskeletal:  Negative for back pain and myalgias.   Skin:  Negative for rash.   Psychiatric/Behavioral:  Negative for behavioral problems and dysphoric mood.        No Known Allergies  Allergy historyupdated.    Outpatient Medications Marked as Taking for the 4/8/24 encounter (Office Visit) with Nani Boles MD   Medication Sig Dispense Refill    norethindrone (MAJO) 0.35 MG tablet Take 1 tablet by mouth daily 84 tablet 4    lisinopril (PRINIVIL;ZESTRIL) 10 MG tablet Take 1 tablet by mouth daily 90 tablet 3    hydroCHLOROthiazide 12.5 MG tablet Take 1 tablet by mouth daily 90 tablet 3       Tobacco use history updated.   Social History     Tobacco Use   Smoking Status Never   Smokeless Tobacco Never        Nursing note reviewed.    Vitals:    04/08/24 0900

## 2024-04-08 NOTE — PATIENT INSTRUCTIONS
your friends and family. And you can do it on your own.    Eat meals with your friends or family. You could try cooking together.    Plan activities with other people. Go for a walk with a friend, try a free online fitness class, or join a sports league.    Eat a variety of healthy foods. These include fruits, vegetables, whole grains, low-fat dairy, and lean protein.    Choose healthy portions of food. You can use the Nutrition Facts label on food packages as a guide.    Eat more fruits and vegetables. You could add vegetables to sandwiches or add fruit to cereal.    Drink water when you are thirsty. Limit soda, juice, and sports drinks.    Try to exercise most days. Aim for at least 2½ hours of exercise each week.    Keep moving. Work in the garden or take your dog on a walk. Use the stairs instead of the elevator.    If you use tobacco or nicotine, try to quit. Ask your doctor about programs and medicines to help you quit.    Limit alcohol. Men should have no more than 2 drinks a day. Women should have no more than 1. For some people, no alcohol is the best choice.  Follow-up care is a key part of your treatment and safety. Be sure to make and go to all appointments, and call your doctor if you are having problems. It's also a good idea to know your test results and keep a list of the medicines you take.  Where can you learn more?  Go to https://www.[employer].net/patientEd and enter U807 to learn more about \"A Healthy Lifestyle: Care Instructions.\"  Current as of: August 6, 2023               Content Version: 14.0  © 2006-2024 [employer].   Care instructions adapted under license by [employer]. If you have questions about a medical condition or this instruction, always ask your healthcare professional. [employer] disclaims any warranty or liability for your use of this information.

## 2024-04-29 SDOH — ECONOMIC STABILITY: INCOME INSECURITY: HOW HARD IS IT FOR YOU TO PAY FOR THE VERY BASICS LIKE FOOD, HOUSING, MEDICAL CARE, AND HEATING?: NOT VERY HARD

## 2024-04-29 SDOH — ECONOMIC STABILITY: FOOD INSECURITY: WITHIN THE PAST 12 MONTHS, YOU WORRIED THAT YOUR FOOD WOULD RUN OUT BEFORE YOU GOT MONEY TO BUY MORE.: NEVER TRUE

## 2024-04-29 SDOH — ECONOMIC STABILITY: FOOD INSECURITY: WITHIN THE PAST 12 MONTHS, THE FOOD YOU BOUGHT JUST DIDN'T LAST AND YOU DIDN'T HAVE MONEY TO GET MORE.: NEVER TRUE

## 2024-04-29 SDOH — ECONOMIC STABILITY: TRANSPORTATION INSECURITY
IN THE PAST 12 MONTHS, HAS LACK OF TRANSPORTATION KEPT YOU FROM MEETINGS, WORK, OR FROM GETTING THINGS NEEDED FOR DAILY LIVING?: YES

## 2024-04-30 ENCOUNTER — OFFICE VISIT (OUTPATIENT)
Dept: OBGYN | Age: 49
End: 2024-04-30
Payer: COMMERCIAL

## 2024-04-30 VITALS
HEIGHT: 69 IN | DIASTOLIC BLOOD PRESSURE: 88 MMHG | WEIGHT: 141 LBS | BODY MASS INDEX: 20.88 KG/M2 | SYSTOLIC BLOOD PRESSURE: 132 MMHG

## 2024-04-30 DIAGNOSIS — Z01.419 WOMEN'S ANNUAL ROUTINE GYNECOLOGICAL EXAMINATION: Primary | ICD-10-CM

## 2024-04-30 DIAGNOSIS — Z87.42 HISTORY OF DYSMENORRHEA: ICD-10-CM

## 2024-04-30 PROCEDURE — 3079F DIAST BP 80-89 MM HG: CPT | Performed by: NURSE PRACTITIONER

## 2024-04-30 PROCEDURE — 99396 PREV VISIT EST AGE 40-64: CPT | Performed by: NURSE PRACTITIONER

## 2024-04-30 PROCEDURE — 3075F SYST BP GE 130 - 139MM HG: CPT | Performed by: NURSE PRACTITIONER

## 2024-04-30 RX ORDER — ACETAMINOPHEN AND CODEINE PHOSPHATE 120; 12 MG/5ML; MG/5ML
1 SOLUTION ORAL DAILY
Qty: 84 TABLET | Refills: 3 | Status: SHIPPED | OUTPATIENT
Start: 2024-04-30

## 2024-04-30 ASSESSMENT — ENCOUNTER SYMPTOMS
DIARRHEA: 0
SHORTNESS OF BREATH: 0
VOMITING: 0
CONSTIPATION: 0
ABDOMINAL PAIN: 0
GASTROINTESTINAL NEGATIVE: 1
RESPIRATORY NEGATIVE: 1
NAUSEA: 0

## 2024-04-30 NOTE — PROGRESS NOTES
and left adnexa normal.      Rectum: Normal.   Musculoskeletal:         General: Normal range of motion.      Cervical back: Normal and normal range of motion.      Lumbar back: Normal.   Lymphadenopathy:      Cervical: No cervical adenopathy.      Upper Body:      Right upper body: No supraclavicular or axillary adenopathy.      Left upper body: No supraclavicular or axillary adenopathy.      Lower Body: No right inguinal adenopathy. No left inguinal adenopathy.   Skin:     General: Skin is warm and dry.   Neurological:      General: No focal deficit present.      Mental Status: She is alert and oriented to person, place, and time.   Psychiatric:         Attention and Perception: Attention normal.         Mood and Affect: Mood normal.         Speech: Speech normal.         Behavior: Behavior is cooperative.         Cognition and Memory: Cognition normal.         Judgment: Judgment normal.         No results found for this visit on 04/30/24.    Assessment and Plan   Diagnosis Orders   1. Women's annual routine gynecological examination        2. History of dysmenorrhea  norethindrone (MAJO) 0.35 MG tablet          Return in about 1 year (around 4/30/2025).    KARISHMA Dickens - CNP

## 2024-06-22 LAB
A/G RATIO: 1.6 RATIO (ref 0.8–2.6)
ALBUMIN: 4.5 G/DL (ref 3.5–5.2)
ALP BLD-CCNC: 69 U/L (ref 23–144)
ALT SERPL-CCNC: 10 U/L (ref 0–60)
AST SERPL-CCNC: 14 U/L (ref 0–55)
BILIRUB SERPL-MCNC: 1.3 MG/DL (ref 0–1.2)
BUN / CREAT RATIO: 12 (ref 7–25)
BUN BLDV-MCNC: 13 MG/DL (ref 3–29)
CALCIUM SERPL-MCNC: 10 MG/DL (ref 8.5–10.5)
CHLORIDE BLD-SCNC: 99 MEQ/L (ref 96–110)
CHOLESTEROL, TOTAL: 192 MG/DL
CO2: 29 MEQ/L (ref 19–32)
CREAT SERPL-MCNC: 1.1 MG/DL (ref 0.5–1.2)
ESTIMATED GLOMERULAR FILTRATION RATE CREATININE EQUATION: 62 MLS/MIN/1.73M2
FASTING STATUS: ABNORMAL
GLOBULIN: 2.8 G/DL (ref 1.9–3.6)
GLUCOSE BLD-MCNC: 93 MG/DL (ref 70–99)
HDLC SERPL-MCNC: 60 MG/DL
LDL CHOLESTEROL: 120 MG/DL
POTASSIUM SERPL-SCNC: 4.6 MEQ/L (ref 3.4–5.3)
SODIUM BLD-SCNC: 139 MEQ/L (ref 135–148)
TOTAL PROTEIN: 7.3 G/DL (ref 6–8.3)
TRIGL SERPL-MCNC: 59 MG/DL
VLDLC SERPL CALC-MCNC: 12 MG/DL (ref 4–32)

## 2025-01-20 ENCOUNTER — HOSPITAL ENCOUNTER (OUTPATIENT)
Dept: WOMENS IMAGING | Age: 50
Discharge: HOME | End: 2025-01-20
Payer: COMMERCIAL

## 2025-01-20 VITALS — BODY MASS INDEX: 20.73 KG/M2 | HEIGHT: 69 IN | WEIGHT: 140 LBS

## 2025-01-20 DIAGNOSIS — Z12.31 ENCOUNTER FOR SCREENING MAMMOGRAM FOR BREAST CANCER: ICD-10-CM

## 2025-01-20 PROCEDURE — 77063 BREAST TOMOSYNTHESIS BI: CPT

## 2025-03-12 DIAGNOSIS — I10 PRIMARY HYPERTENSION: ICD-10-CM

## 2025-03-13 RX ORDER — LISINOPRIL 10 MG/1
10 TABLET ORAL DAILY
Qty: 90 TABLET | Refills: 0 | Status: SHIPPED | OUTPATIENT
Start: 2025-03-13

## 2025-03-29 SDOH — ECONOMIC STABILITY: FOOD INSECURITY: WITHIN THE PAST 12 MONTHS, YOU WORRIED THAT YOUR FOOD WOULD RUN OUT BEFORE YOU GOT MONEY TO BUY MORE.: NEVER TRUE

## 2025-03-29 SDOH — ECONOMIC STABILITY: FOOD INSECURITY: WITHIN THE PAST 12 MONTHS, THE FOOD YOU BOUGHT JUST DIDN'T LAST AND YOU DIDN'T HAVE MONEY TO GET MORE.: NEVER TRUE

## 2025-03-29 SDOH — ECONOMIC STABILITY: INCOME INSECURITY: IN THE LAST 12 MONTHS, WAS THERE A TIME WHEN YOU WERE NOT ABLE TO PAY THE MORTGAGE OR RENT ON TIME?: NO

## 2025-03-29 ASSESSMENT — PATIENT HEALTH QUESTIONNAIRE - PHQ9
SUM OF ALL RESPONSES TO PHQ QUESTIONS 1-9: 0
1. LITTLE INTEREST OR PLEASURE IN DOING THINGS: NOT AT ALL
1. LITTLE INTEREST OR PLEASURE IN DOING THINGS: NOT AT ALL
2. FEELING DOWN, DEPRESSED OR HOPELESS: NOT AT ALL
SUM OF ALL RESPONSES TO PHQ QUESTIONS 1-9: 0
2. FEELING DOWN, DEPRESSED OR HOPELESS: NOT AT ALL
SUM OF ALL RESPONSES TO PHQ QUESTIONS 1-9: 0
SUM OF ALL RESPONSES TO PHQ QUESTIONS 1-9: 0
SUM OF ALL RESPONSES TO PHQ9 QUESTIONS 1 & 2: 0

## 2025-04-01 ENCOUNTER — OFFICE VISIT (OUTPATIENT)
Dept: FAMILY MEDICINE CLINIC | Age: 50
End: 2025-04-01
Payer: COMMERCIAL

## 2025-04-01 VITALS
HEART RATE: 124 BPM | WEIGHT: 141 LBS | DIASTOLIC BLOOD PRESSURE: 84 MMHG | BODY MASS INDEX: 20.82 KG/M2 | SYSTOLIC BLOOD PRESSURE: 124 MMHG | OXYGEN SATURATION: 98 %

## 2025-04-01 DIAGNOSIS — I10 PRIMARY HYPERTENSION: ICD-10-CM

## 2025-04-01 DIAGNOSIS — F43.22 ADJUSTMENT DISORDER WITH ANXIETY: ICD-10-CM

## 2025-04-01 DIAGNOSIS — E78.2 MIXED HYPERLIPIDEMIA: Primary | ICD-10-CM

## 2025-04-01 PROCEDURE — 3074F SYST BP LT 130 MM HG: CPT | Performed by: FAMILY MEDICINE

## 2025-04-01 PROCEDURE — 99214 OFFICE O/P EST MOD 30 MIN: CPT | Performed by: FAMILY MEDICINE

## 2025-04-01 PROCEDURE — 3079F DIAST BP 80-89 MM HG: CPT | Performed by: FAMILY MEDICINE

## 2025-04-01 RX ORDER — LISINOPRIL 10 MG/1
10 TABLET ORAL DAILY
Qty: 90 TABLET | Refills: 3 | Status: SHIPPED | OUTPATIENT
Start: 2025-04-01

## 2025-04-01 RX ORDER — HYDROCHLOROTHIAZIDE 12.5 MG/1
12.5 TABLET ORAL DAILY
Qty: 90 TABLET | Refills: 3 | Status: SHIPPED | OUTPATIENT
Start: 2025-04-01

## 2025-04-01 NOTE — PROGRESS NOTES
Chief Complaint   Patient presents with    Follow-up     Patient is here for annual follow-up    Hypertension     Patient diagnosed with hypertension in adequate control on current medications which include lisinopril 10 and a separate pill containing hydrochlorothiazide 12.5, no problems with medication but due for labs    Cholesterol Problem     Patient with hyperlipidemia and no meds presently but due for annual check of this       Torres Martinez NARRATIVE:    History of Present Illness  The patient presents for an annual follow-up. The chief complaints include hypertension and a history of adjustment disorder, which is currently stable. Chronic disease management requires refills and standard fasting blood work. Additionally, mixed hyperlipidemia is noted.    No new health concerns are reported, and stress levels and mood are described as \"pretty good.\" She mentions that her youngest child is graduating and they are navigating college decisions, which has been a significant source of stress. Despite these challenges, overall health is maintained.    Her exhusband was hospitalized for acute mathew, but her (adult) children are handling his health management with her as backup so her stress is not as it was.     Patient is established unless otherwise noted.  Above chief complaint and Torres Martinez obtained by this physician provider.     Patient consented to using an automated transcription service to record our visit and provide summary directly into the chart.    Review of Systems    No Known Allergies  Allergy historyupdated.    Outpatient Medications Marked as Taking for the 4/1/25 encounter (Office Visit) with Nani Boles MD   Medication Sig Dispense Refill    lisinopril (PRINIVIL;ZESTRIL) 10 MG tablet Take 1 tablet by mouth daily 90 tablet 3    hydroCHLOROthiazide 12.5 MG tablet Take 1 tablet by mouth daily 90 tablet 3    norethindrone (MAJO) 0.35 MG tablet Take 1 tablet by mouth daily 84 tablet 3       Tobacco use

## 2025-05-05 ENCOUNTER — OFFICE VISIT (OUTPATIENT)
Dept: OBGYN | Age: 50
End: 2025-05-05
Payer: COMMERCIAL

## 2025-05-05 ENCOUNTER — HOSPITAL ENCOUNTER (OUTPATIENT)
Age: 50
Setting detail: SPECIMEN
Discharge: HOME | End: 2025-05-05
Payer: COMMERCIAL

## 2025-05-05 VITALS
HEIGHT: 69 IN | HEART RATE: 86 BPM | WEIGHT: 142 LBS | BODY MASS INDEX: 21.03 KG/M2 | SYSTOLIC BLOOD PRESSURE: 115 MMHG | DIASTOLIC BLOOD PRESSURE: 77 MMHG

## 2025-05-05 DIAGNOSIS — Z01.419 WOMEN'S ANNUAL ROUTINE GYNECOLOGICAL EXAMINATION: Primary | ICD-10-CM

## 2025-05-05 DIAGNOSIS — Z87.42 HISTORY OF DYSMENORRHEA: ICD-10-CM

## 2025-05-05 PROCEDURE — 3078F DIAST BP <80 MM HG: CPT | Performed by: NURSE PRACTITIONER

## 2025-05-05 PROCEDURE — 3074F SYST BP LT 130 MM HG: CPT | Performed by: NURSE PRACTITIONER

## 2025-05-05 PROCEDURE — 87624 HPV HI-RISK TYP POOLED RSLT: CPT

## 2025-05-05 PROCEDURE — 99396 PREV VISIT EST AGE 40-64: CPT | Performed by: NURSE PRACTITIONER

## 2025-05-05 PROCEDURE — G0123 SCREEN CERV/VAG THIN LAYER: HCPCS

## 2025-05-05 RX ORDER — ACETAMINOPHEN AND CODEINE PHOSPHATE 120; 12 MG/5ML; MG/5ML
1 SOLUTION ORAL DAILY
Qty: 84 TABLET | Refills: 3 | Status: SHIPPED | OUTPATIENT
Start: 2025-05-05

## 2025-05-05 ASSESSMENT — ENCOUNTER SYMPTOMS
NAUSEA: 0
RESPIRATORY NEGATIVE: 1
ABDOMINAL PAIN: 0
VOMITING: 0
SHORTNESS OF BREATH: 0
GASTROINTESTINAL NEGATIVE: 1
DIARRHEA: 0
CONSTIPATION: 0

## 2025-05-05 NOTE — PROGRESS NOTES
25    Cristal Ortega  1975    Chief Complaint   Patient presents with    Gynecologic Exam     Annual exam. Non-smoker No known h/o dvt. Patient is sexually active. Patient is on oral contraception. No menses with ocp.  Pap Smear was  2022 and results were negativeHPV negative. Patient had a recent mammogram 2025 which was negative for malignancy, showed density. Patient request refill of ocp.  No c/p.         The patient is a 49 y.o. female,  who presents for her annual exam.  She is menstruating normally.  She is  sexually active. She is currently taking birth control.  Birth control method is oral contraceptive.    She reports no gynecological symptoms.      Pap smear history: Her last PAP smear was in .  Her results were normal.    Breast history: her most recent mammogram was in 2025.  The results were: Normal    Colonoscopy ; normal per pt    Past Medical History:   Diagnosis Date    Abnormal Pap smear of cervix     Anxiety     Breast density     Broken collarbone     6 years old    Hypertension     Migraine        Past Surgical History:   Procedure Laterality Date     SECTION         Family History   Problem Relation Age of Onset    Cancer Paternal Grandmother     Cancer Father     High Cholesterol Father     Hypertension Father     Gall Bladder Disease Father     High Cholesterol Mother     Hypertension Mother     Thyroid Disease Mother     Cancer Mother     Breast Cancer Neg Hx     Ovarian Cancer Neg Hx        Social History     Tobacco Use    Smoking status: Never    Smokeless tobacco: Never   Vaping Use    Vaping status: Never Used   Substance Use Topics    Alcohol use: Yes     Alcohol/week: 1.0 standard drink of alcohol     Types: 1 Glasses of wine per week     Comment: occ    Drug use: No       Current Outpatient Medications   Medication Sig Dispense Refill    norethindrone (MAJO) 0.35 MG tablet Take 1 tablet by mouth daily 84 tablet 3    lisinopril

## 2025-05-08 LAB
COMMENT: NORMAL
GYNECOLOGY CYTOLOGY REPORT: NORMAL
HPV OTHER HR TYPES: NOT DETECTED
HPV TYPE 16: NOT DETECTED
HPV TYPE 18: NOT DETECTED

## 2025-07-12 LAB
A/G RATIO: 1.6 RATIO (ref 0.8–2.6)
ALBUMIN: 4.3 G/DL (ref 3.5–5.2)
ALP BLD-CCNC: 83 U/L (ref 23–144)
ALT SERPL-CCNC: 10 U/L (ref 0–60)
AST SERPL-CCNC: 18 U/L (ref 0–55)
BILIRUB SERPL-MCNC: 1.5 MG/DL (ref 0–1.2)
BUN / CREAT RATIO: 13 (ref 7–25)
BUN BLDV-MCNC: 13 MG/DL (ref 3–29)
CALCIUM SERPL-MCNC: 10 MG/DL (ref 8.5–10.5)
CHLORIDE BLD-SCNC: 100 MEQ/L (ref 96–110)
CHOLESTEROL, TOTAL: 185 MG/DL
CO2: 26 MEQ/L (ref 19–32)
CREAT SERPL-MCNC: 1 MG/DL (ref 0.5–1.2)
ESTIMATED GLOMERULAR FILTRATION RATE CREATININE EQUATION: 69 MLS/MIN/1.73M2
FASTING STATUS: ABNORMAL
GLOBULIN: 2.7 G/DL (ref 1.9–3.6)
GLUCOSE BLD-MCNC: 91 MG/DL (ref 70–99)
HDLC SERPL-MCNC: 64 MG/DL
LDL CHOLESTEROL: 111 MG/DL
POTASSIUM SERPL-SCNC: 4.3 MEQ/L (ref 3.4–5.3)
SODIUM BLD-SCNC: 139 MEQ/L (ref 135–148)
TOTAL PROTEIN: 7 G/DL (ref 6–8.3)
TRIGL SERPL-MCNC: 50 MG/DL
VLDLC SERPL CALC-MCNC: 10 MG/DL (ref 4–32)